# Patient Record
Sex: FEMALE | Race: BLACK OR AFRICAN AMERICAN | URBAN - METROPOLITAN AREA
[De-identification: names, ages, dates, MRNs, and addresses within clinical notes are randomized per-mention and may not be internally consistent; named-entity substitution may affect disease eponyms.]

---

## 2017-09-14 ENCOUNTER — OFFICE VISIT (OUTPATIENT)
Dept: FAMILY MEDICINE CLINIC | Age: 42
End: 2017-09-14

## 2017-09-14 VITALS
OXYGEN SATURATION: 96 % | SYSTOLIC BLOOD PRESSURE: 144 MMHG | HEART RATE: 79 BPM | WEIGHT: 240 LBS | DIASTOLIC BLOOD PRESSURE: 89 MMHG | BODY MASS INDEX: 38.57 KG/M2 | RESPIRATION RATE: 14 BRPM | HEIGHT: 66 IN | TEMPERATURE: 97.6 F

## 2017-09-14 DIAGNOSIS — K50.00 CROHN'S DISEASE OF SMALL INTESTINE WITHOUT COMPLICATION (HCC): Primary | ICD-10-CM

## 2017-09-14 PROBLEM — K50.90 CROHN DISEASE (HCC): Status: ACTIVE | Noted: 2017-09-14

## 2017-09-14 RX ORDER — METHYLPREDNISOLONE 4 MG/1
TABLET ORAL
COMMUNITY
Start: 2017-07-06 | End: 2017-10-17 | Stop reason: ALTCHOICE

## 2017-09-14 RX ORDER — HYDROXYZINE PAMOATE 25 MG/1
25 CAPSULE ORAL ONCE
COMMUNITY
Start: 2017-09-13

## 2017-09-14 RX ORDER — MUPIROCIN 20 MG/G
OINTMENT TOPICAL
COMMUNITY
Start: 2017-08-11 | End: 2017-10-17 | Stop reason: ALTCHOICE

## 2017-09-14 RX ORDER — MINOCYCLINE HYDROCHLORIDE 100 MG/1
CAPSULE ORAL
COMMUNITY
Start: 2017-08-16 | End: 2017-10-17 | Stop reason: ALTCHOICE

## 2017-09-14 RX ORDER — LANCETS
EACH MISCELLANEOUS
COMMUNITY
Start: 2017-07-24

## 2017-09-14 RX ORDER — TRIAMCINOLONE ACETONIDE 1 MG/G
CREAM TOPICAL
COMMUNITY
Start: 2017-08-11

## 2017-09-14 RX ORDER — AZATHIOPRINE 50 MG/1
50 TABLET ORAL DAILY
COMMUNITY
Start: 2017-09-01

## 2017-09-14 RX ORDER — NITROFURANTOIN 25; 75 MG/1; MG/1
CAPSULE ORAL
COMMUNITY
Start: 2017-07-24 | End: 2017-10-17 | Stop reason: ALTCHOICE

## 2017-09-14 RX ORDER — BLOOD SUGAR DIAGNOSTIC
STRIP MISCELLANEOUS
COMMUNITY
Start: 2017-07-24

## 2017-09-14 NOTE — PROGRESS NOTES
[facility]        Name and  verified        Chief Complaint   Patient presents with   [facility] Patient

## 2017-09-14 NOTE — PROGRESS NOTES
HISTORY OF PRESENT ILLNESS  Chloé Marti is a 38 y.o. female. HPI     Obesity with crohn Dz  Specific concerns today for my pt is the wt concerning, the patient states that no self induced vomiting, and no binge eating,  has been thinking about the use of any laxative use for a better wt, pt also states that different available diets has been tried, does some daily exercises, tries to avoid fast food as much as possible. In addition, the patient states that eating too much is not the case and the portion are very well controlled being on the heavy side for a few yrs, very concerned about the huge wt, gives every body a bad body image,and finally stating that the best is to loose as much wt as possible. azathioprin and others   No fhx of heart dz,   No hx of sudden death <34yo    Allergies not on file  No past medical history on file. No past surgical history on file. No family history on file. Social History   Substance Use Topics    Smoking status: Not on file    Smokeless tobacco: Not on file    Alcohol use Not on file      No results found for: HBA1C, PNO0OHCH, HGBE8, GLU, GESTF, GLUCPOC, MCACR, MCA1, MCA2, MCA3, MCAU, LDL, LDLC, DLDLP, ELIZABETH, CREAPOC, MCREA, ACREA, CREA, REFC3, REFC4, [id number]   No results found for: TSH, TSH2, TSH3, TSHP, TSHELE, TSHEXT, TT3, T3U, T3UP, FRT3, FT3, FT4, FT4P, T4, T4P, FT4T, TT7, TSHEXT      Review of Systems   Constitutional: Negative for chills and fever. HENT: Negative for ear pain and nosebleeds. Eyes: Negative for blurred vision, pain and discharge. Respiratory: Negative for shortness of breath. Cardiovascular: Negative for chest pain and leg swelling. Gastrointestinal: Negative for constipation, diarrhea, nausea and vomiting. Genitourinary: Negative for frequency. Musculoskeletal: Negative for joint pain. Skin: Negative for itching and rash. Neurological: Negative for headaches. Psychiatric/Behavioral: Negative for depression.  The patient is not nervous/anxious. Physical Exam   Constitutional: She is oriented to person, place, and time. She appears well-developed and well-nourished. HENT:   Head: Normocephalic and atraumatic. Eyes: Conjunctivae and EOM are normal.   Neck: Normal range of motion. Neck supple. Cardiovascular: Normal rate, regular rhythm and normal heart sounds. No murmur heard. Pulmonary/Chest: Effort normal and breath sounds normal.   Abdominal: Soft. Bowel sounds are normal. She exhibits no distension. Musculoskeletal: Normal range of motion. She exhibits no edema. Neurological: She is alert and oriented to person, place, and time. Skin: No erythema. Psychiatric: Her behavior is normal.   Nursing note and vitals reviewed. ASSESSMENT and PLAN  Diagnoses and all orders for this visit:    1.  Crohn's disease of small intestine without complication (HCC)  -     CBC W/O DIFF  -     METABOLIC PANEL, COMPREHENSIVE  -     TSH 3RD GENERATION  -     CHOLESTEROL, TOTAL  -     HDL CHOLESTEROL         I have recommended the following steps for improving the current weight, pt was counseled on to try to Decrease calori intake by 500per day, limit the amount of intake and portion the meals,   Attend weight loss classes and lectures, Pt agreed with all the recommendations

## 2017-09-14 NOTE — MR AVS SNAPSHOT
Visit Information Date & Time Provider Department Dept. Phone Encounter #  
 9/14/2017 11:00 AM Amelia Mason MD [facility] OFFICE-[facility]EX [phone] [id number] Follow-up Instructions Return in about 2 weeks (around 9/28/2017), or if symptoms worsen or fail to improve. Your Appointments 12/14/2017  9:30 AM  
Any with Alan Enriquez MD  
[facility] OFFICE-ANN[facility] ([address]) Appt Note: 3 mo f/u  
 [address] [facility] [address] [id number]  
[phone] [facility] [id number] Upcoming Health Maintenance Date Due DTaP/Tdap/Td series (1 - Tdap) 5/30/1994 PAP AKA CERVICAL CYTOLOGY 5/30/1994 INFLUENZA AGE 9 TO ADULT 8/1/2017 Allergies as of 9/14/2017  Review Complete On: 9/14/2017 By: Miriam Cody LPN Not on File Current Immunizations  Never Reviewed No immunizations on file. Not reviewed this visit You Were Diagnosed With   
  
 Codes Comments Cro[facility]'s [facility] of small intestine without complication [facility]    -  Primary ICD-10-CM: K50.00 ICD-9-CM: 555.0 Vitals BP Pulse Temp Resp Height(growth percentile) Weight(growth percentile) 144/89 (BP 1 Location: Left arm, BP Patient Position: At rest) 79 97.6 °F (36.4 °C) (Oral) 14 5' 6\" (1.676 m) 240 lb (108.9 kg) SpO2 BMI OB Status Smoking Status 96% 38.74 kg/m2 Medically Induced Never Smoker Vitals History BMI and BSA Data Body Mass Index Body Surface Area 38.74 kg/m 2 2.25 m 2 Your Updated Medication List  
  
   
This list is accurate as of: 9/14/17 11:35 AM.  Always use your most recent med list.  
  
  
  
  
 [facility] Generic drug:  Lancets  
  
 azaTHIOprine 50 mg tablet Commonly known as:  IMURAN  
  
 hydrOXYzine pamoate 25 mg capsule Commonly known as:  VISTARIL  
  
 JOLIVETTE 0.35 mg Tab Generic drug:  norethindrone  
  
 methylPREDNISolone 4 mg tablet Commonly known as:  MEDROL DOSEPACK  
  
 minocycline 100 mg capsule Commonly known as:  MINOCIN, DYNACIN  
  
 mupirocin 2 % ointment Commonly known as:  BACTROBAN  
  
 nitrofurantoin (macrocrystal-monohydrate) 100 mg capsule Commonly known as:  MACROBID PRECISION XTRA TEST strip Generic drug:  glucose blood VI test strips  
  
 triamcinolone acetonide 0.1 % topical cream  
Commonly known as:  KENALOG We Performed the Following CBC W/O DIFF [21547 CPT(R)] CHOLESTEROL, TOTAL [94641 CPT(R)] HDL CHOLESTEROL [91411 CPT(R)] METABOLIC PANEL, COMPREHENSIVE [19544 CPT(R)] TSH 3RD GENERATION [42036 CPT(R)] Follow-up Instructions Return in about 2 weeks (around 9/28/2017), or if symptoms worsen or fail to improve. Introducing [facility]! Pooja Frazier introduces Face-Me patient portal. Now you can access parts of your medical record, email your doctor's office, and request medication refills online. 1. In your internet browser, go to [email or web address] 2. Click on the First Time User? Click Here link in the Sign In box. You will see the New Member Sign Up page. 3. Enter your Face-Me Access Code exactly as it appears below. You will not need to use this code after youve completed the sign-up process. If you do not sign up before the expiration date, you must request a new code. · Face-Me Access Code: [email or web address] Expires: 12/13/2017 11:34 AM 
 
4. Enter the last four digits of your Social Security Number (xxxx) and Date of Birth (mm/dd/yyyy) as indicated and click Submit. You will be taken to the next sign-up page. 5. Create a Face-Me ID. This will be your Face-Me login ID and cannot be changed, so think of one that is secure and easy to remember. 6. Create a [employer] password. You can change your password at any time. 7. Enter your Password Reset Question and Answer. This can be used at a later time if you forget your password. 8. Enter your e-mail address. You will receive e-mail notification when new information is available in [address] 9. Click Sign Up. You can now view and download portions of your medical record. 10. Click the Download Summary menu link to download a portable copy of your medical information. If you have questions, please visit the Frequently Asked Questions section of the [employer] website. Remember, [employer]ines is NOT to be used for urgent needs. For medical emergencies, dial 911. Now available from your iPhone and Android! Please provide this summary of care documentation to your next provider. Your primary care clinician is listed as Tahir Munoz. If you have any questions after today's visit, please call [phone].

## 2017-09-15 LAB
ALBUMIN SERPL-MCNC: 3.8 G/DL (ref 3.5–5.5)
ALBUMIN/GLOB SERPL: 1.2 {RATIO} (ref 1.2–2.2)
ALP SERPL-CCNC: 89 IU/L (ref 39–117)
ALT SERPL-CCNC: 16 IU/L (ref 0–32)
AST SERPL-CCNC: 14 IU/L (ref 0–40)
BILIRUB SERPL-MCNC: 0.6 MG/DL (ref 0–1.2)
BUN SERPL-MCNC: 13 MG/DL (ref 6–24)
BUN/CREAT SERPL: 13 (ref 9–23)
CALCIUM SERPL-MCNC: 9.4 MG/DL (ref 8.7–10.2)
CHLORIDE SERPL-SCNC: 99 MMOL/L (ref 96–106)
CHOLEST SERPL-MCNC: 198 MG/DL (ref 100–199)
CO2 SERPL-SCNC: 25 MMOL/L (ref 18–29)
CREAT SERPL-MCNC: 1.04 MG/DL (ref 0.57–1)
ERYTHROCYTE [DISTWIDTH] IN BLOOD BY AUTOMATED COUNT: 14.9 % (ref 12.3–15.4)
GLOBULIN SER CALC-MCNC: 3.2 G/DL (ref 1.5–4.5)
GLUCOSE SERPL-MCNC: 64 MG/DL (ref 65–99)
HCT VFR BLD AUTO: 37.6 % (ref 34–46.6)
HDLC SERPL-MCNC: 36 MG/DL
HGB BLD-MCNC: 12.6 G/DL (ref 11.1–15.9)
MCH RBC QN AUTO: 26.9 PG (ref 26.6–33)
MCHC RBC AUTO-ENTMCNC: 33.5 G/DL (ref 31.5–35.7)
MCV RBC AUTO: 80 FL (ref 79–97)
PLATELET # BLD AUTO: 229 X10E3/UL (ref 150–379)
POTASSIUM SERPL-SCNC: 4.2 MMOL/L (ref 3.5–5.2)
PROT SERPL-MCNC: 7 G/DL (ref 6–8.5)
RBC # BLD AUTO: 4.69 X10E6/UL (ref 3.77–5.28)
SODIUM SERPL-SCNC: 140 MMOL/L (ref 134–144)
TSH SERPL DL<=0.005 MIU/L-ACNC: 0.8 UIU/ML (ref 0.45–4.5)
WBC # BLD AUTO: 8.1 X10E3/UL (ref 3.4–10.8)

## 2017-10-17 ENCOUNTER — OFFICE VISIT (OUTPATIENT)
Dept: FAMILY MEDICINE CLINIC | Age: 42
End: 2017-10-17

## 2017-10-17 VITALS
BODY MASS INDEX: 38.89 KG/M2 | OXYGEN SATURATION: 97 % | TEMPERATURE: 98.1 F | HEIGHT: 66 IN | HEART RATE: 73 BPM | SYSTOLIC BLOOD PRESSURE: 172 MMHG | RESPIRATION RATE: 14 BRPM | DIASTOLIC BLOOD PRESSURE: 96 MMHG | WEIGHT: 242 LBS

## 2017-10-17 DIAGNOSIS — K50.90 CROHN'S DISEASE WITHOUT COMPLICATION, UNSPECIFIED GASTROINTESTINAL TRACT LOCATION (HCC): Primary | ICD-10-CM

## 2017-10-17 LAB — HBA1C MFR BLD HPLC: 6.5 %

## 2017-10-17 RX ORDER — ACETAMINOPHEN 500 MG
TABLET ORAL
COMMUNITY

## 2017-10-17 RX ORDER — PHENOL/SODIUM PHENOLATE
20 AEROSOL, SPRAY (ML) MUCOUS MEMBRANE DAILY
COMMUNITY

## 2017-10-17 RX ORDER — AMLODIPINE BESYLATE 10 MG/1
TABLET ORAL DAILY
COMMUNITY

## 2017-10-17 RX ORDER — GLIPIZIDE 5 MG/1
TABLET ORAL 2 TIMES DAILY
COMMUNITY

## 2017-10-17 RX ORDER — CITALOPRAM 40 MG/1
40 TABLET, FILM COATED ORAL DAILY
COMMUNITY

## 2017-10-17 NOTE — PROGRESS NOTES
HISTORY OF PRESENT ILLNESS  Jeferson James is a 38 y.o. female.   HPI   Patient present for follow-up concerning about her weight stating that has been dealing with posttraumatic stress syndrome currently taking Wellbutrin and hydroxyzine for sleep is not eating too much or not to lose as much weight as possible in addition has a history of Crohn disease currently taking as a few provide has not had any attacks recently usually get her medication from [facility] in addition stating that she has diabetes last visit she did not mention any of the current medical condition her diabetic number was not checked she just presented as a new patient for weight management for high body mass index  On glipizide and propranolol, amlodipine, rtc for wt loss management pt w/hx of crohn dz, took steroid for so long and the wt went up, no crohn flares recently,   Patient denies any suicidal homicidal ideation stating that she is compliant with her medication usually her blood pressure is normal today is agitated and does not feel comfortable at doctor's office    Current Outpatient Prescriptions   Medication Sig Dispense Refill    azaTHIOprine (IMURAN) 50 mg tablet       PRECISION XTRA TEST strip       hydrOXYzine pamoate (VISTARIL) 25 mg capsule       ACCU-CHEK SOFTCLIX LANCETS misc       methylPREDNISolone (MEDROL DOSEPACK) 4 mg tablet       minocycline (MINOCIN, DYNACIN) 100 mg capsule       mupirocin (BACTROBAN) 2 % ointment       nitrofurantoin, macrocrystal-monohydrate, (MACROBID) 100 mg capsule       JOLIVETTE 0.35 mg tab       triamcinolone acetonide (KENALOG) 0.1 % topical cream        Not on File  Past Medical History:   Diagnosis Date    Anemia     [facility]immune [facility] ([facility]     Contact dermatitis and other eczema, due to unspecified cause     Depression     Diabetes ([facility]     GERD (gastroesophageal reflux disease)     Headache     Hypertension      Past Surgical History:   Procedure Laterality Date    HX BREAST REDUCTION      HX GYN       No family history on file. Social History   Substance Use Topics    Smoking status: Never Smoker    Smokeless tobacco: Never Used    Alcohol use Yes      Comment: at times      Lab Results  Component Value Date/Time   WBC 8.1 09/14/2017 11:36 AM   HGB 12.6 09/14/2017 11:36 AM   HCT 37.6 09/14/2017 11:36 AM   PLATELET 647 32/61/2059 11:36 AM   MCV 80 09/14/2017 11:36 AM     Lab Results  Component Value Date/Time   Glucose 64 09/14/2017 11:36 AM   Creatinine 1.04 09/14/2017 11:36 AM      Lab Results  Component Value Date/Time   Cholesterol, total 198 09/14/2017 11:36 AM   HDL Cholesterol 36 09/14/2017 11:36 AM   Lab Results  Component Value Date/Time   GFR est non-AA 66 09/14/2017 11:36 AM   GFR est AA 76 09/14/2017 11:36 AM   Creatinine 1.04 09/14/2017 11:36 AM   BUN 13 09/14/2017 11:36 AM   Sodium 140 09/14/2017 11:36 AM   Potassium 4.2 09/14/2017 11:36 AM   Chloride 99 09/14/2017 11:36 AM   CO2 25 09/14/2017 11:36 AM   Lab Results  Component Value Date/Time   TSH 0.798 09/14/2017 11:36 AM           Review of Systems   Constitutional: Negative for chills and fever. HENT: Negative for ear pain and nosebleeds. Eyes: Negative for blurred vision, pain and discharge. Respiratory: Negative for shortness of breath. Cardiovascular: Negative for chest pain and leg swelling. Gastrointestinal: Negative for constipation, diarrhea, nausea and vomiting. Genitourinary: Negative for frequency. Musculoskeletal: Negative for joint pain. Skin: Negative for itching and rash. Neurological: Negative for headaches. Psychiatric/Behavioral: Negative for depression. The patient is not nervous/anxious. Physical Exam   Constitutional: She is oriented to person, place, and time. She appears well-developed and well-nourished. HENT:   Head: Normocephalic and atraumatic. Eyes: Conjunctivae and EOM are normal.   Neck: Normal range of motion. Neck supple.    Cardiovascular: Normal rate, regular rhythm and normal heart sounds. No murmur heard. Pulmonary/Chest: Effort normal and breath sounds normal.   Abdominal: Soft. Bowel sounds are normal. She exhibits no distension. Musculoskeletal: Normal range of motion. She exhibits no edema. Neurological: She is alert and oriented to person, place, and time. Skin: No erythema. Psychiatric: Her behavior is normal.   Nursing note and vitals reviewed. ASSESSMENT and PLAN  Diagnoses and all orders for this visit:    1. Crohn's disease without complication, unspecified gastrointestinal tract location [facility]  -     AMB POC HEMOGLOBIN A1C    2. BMI 38.0-38.9,adult  -     AMB POC HEMOGLOBIN A1C    Other orders  -     naltrexone-buPROPion (CONTRAVE) 8-90 mg TbER ER tablet; Take 1 Tab by mouth daily. First Month:     Week 1 : 1 Tab AM  Week 2 : 1 Tab AM, 1 Tab PM  Week 3 : 2 Tab AM, 1 Tab PM  Week 4 : 2 Tab AM, 2 Tab PM  -     naltrexone-buPROPion (CONTRAVE) 8-90 mg TbER ER tablet; Week 5 and Beyond: Contrave 8mg/90mg #120   2 Tab AM, 2 Tab PM         I have recommended the following steps for improving the current weight, pt was counseled on to try to Decrease calori intake by 500per day, limit the amount of intake and portion the meals, The patient is advised to avoid second hand exposure, begin progressive daily aerobic exercise program at least x 5 per week,  follow a low fat, low cholesterol diet, attempt to keep dairy records of  Weight and the daily intake, reduce salt in diet and cooking, reduce exposure to stress, improve dietary compliance, continue current healthy lifestyle patterns.  Attend weight loss classes and lectures, Pt agreed with all the recommendations

## 2017-10-17 NOTE — PATIENT INSTRUCTIONS
Crohn's Disease: Care Instructions  Your Care Instructions    Crohn's disease is a lifelong inflammatory bowel disease (IBD). Parts of the digestive tract get swollen and irritated and may develop deep sores called ulcers. Crohn's disease usually occurs in the last part of the small intestine and the first part of the large intestine. But it can develop anywhere from the mouth to the anus. The main symptoms of Crohn's disease are belly pain, diarrhea, fever, and weight loss. Some people may have constipation. Crohn's disease also sometimes causes problems with the joints, eyes, or skin. Your symptoms may be mild at some times and severe at others. The disease can also go into remission, which means that it is not active and you have no symptoms. Bad attacks of Crohn's disease often have to be treated in the hospital so that you can get medicines and liquids through a tube in your vein, called an IV. This gives your digestive system time to rest and recover. Talk with your doctor about the best treatments for you. You may need medicines that help prevent or treat flare-ups of the disease. You may need surgery to remove part of your bowel if you have an abnormal opening in the bowel (fistula), an abscess, or a bowel obstruction. In some cases, surgery is needed if medicines do not work. But symptoms often return to other areas of the intestines after surgery. Learning good self-care can help you reduce your symptoms and manage Crohn's disease. Follow-up care is a key part of your treatment and safety. Be sure to make and go to all appointments, and call your doctor if you are having problems. Its also a good idea to know your test results and keep a list of the medicines you take. How can you care for yourself at home? · Take your medicines exactly as prescribed. Call your doctor if you think you are having a problem with your medicine.  You will get more details on the specific medicines your doctor prescribes. · Do not take anti-inflammatory medicines, such as aspirin, ibuprofen (Advil, Motrin), or naproxen (Aleve). They may make your symptoms worse. Do not take any other medicines or herbal products without talking to your doctor first.  · Avoid foods that make your symptoms worse. These might include milk, alcohol, high-fiber foods, or spicy foods. · Eat a healthy diet. Make sure to get enough iron. Rectal bleeding may make you lose iron. Good sources of iron include beef, lentils, spinach, raisins, and iron-enriched breads and cereals. · Drink liquid meal replacements if your doctor recommends them. These are high in calories and contain vitamins and minerals. Severe symptoms may make it hard for your body to absorb vitamins and minerals. · Do not smoke. Smoking makes Crohn's disease worse. If you need help quitting, talk to your doctor about stop-smoking programs and medicines. These can increase your chances of quitting for good. · Seek support from friends and family to help cope with Crohn's disease. The illness can affect all parts of your life. Get counseling if you need it. When should you call for help? Call 911 anytime you think you may need emergency care. For example, call if:  · You have severe belly pain. · You passed out (lost consciousness). Call your doctor now or seek immediate medical care if:  · You have signs of needing more fluids. You have sunken eyes and a dry mouth, and you pass only a little dark urine. · You have pain and swelling in the anal area, or you have pus draining from the anal area. · You have a fever or shaking chills. · Your belly is bloated. Watch closely for changes in your health, and be sure to contact your doctor if:  · Your symptoms get worse. · You have diarrhea for more than 2 weeks. · Your pain is not steadily getting better. · You have unexplained weight loss. Where can you learn more?   Go to http://andrew-ofe.info/. Enter 21  in the search box to learn more about \"Crohn's Disease: Care Instructions. \"  Current as of: August 9, 2016  Content Version: 11.3  © 4500-6416 [employer]. Care instructions adapted under license by [employer] (which disclaims liability or warranty for this information). If you have questions about a medical condition or this instruction, always ask your healthcare professional. [place] [address] any warranty or liability for your use of this information. Learning About Diabetes Food Guidelines  Your Care Instructions  Meal planning is important to manage diabetes. It helps keep your blood sugar at a target level (which you set with your doctor). You don't have to eat special foods. You can eat what your family eats, including sweets once in a while. But you do have to pay attention to how often you eat and how much you eat of certain foods. You may want to work with a dietitian or a certified diabetes educator (CDE) to help you plan meals and snacks. A dietitian or CDE can also help you lose weight if that is one of your goals. What should you know about eating carbs? Managing the amount of carbohydrate (carbs) you eat is an important part of healthy meals when you have diabetes. Carbohydrate is found in many foods. · Learn which foods have carbs. And learn the amounts of carbs in different foods. ¨ Bread, cereal, pasta, and rice have about 15 grams of carbs in a serving. A serving is 1 slice of bread (1 ounce), ½ cup of cooked cereal, or 1/3 cup of cooked pasta or rice. ¨ Fruits have 15 grams of carbs in a serving. A serving is 1 small fresh fruit, such as an apple or orange; ½ of a banana; ½ cup of cooked or canned fruit; ½ cup of fruit juice; 1 cup of melon or raspberries; or 2 tablespoons of dried fruit. ¨ Milk and no-sugar-added yogurt have 15 grams of carbs in a serving.  A serving is 1 cup of milk or 2/3 cup of no-sugar-added yogurt. ¨ Starchy vegetables have 15 grams of carbs in a serving. A serving is ½ cup of mashed potatoes or sweet potato; 1 cup winter squash; ½ of a small baked potato; ½ cup of cooked beans; or ½ cup cooked corn or green peas. · Learn how much carbs to eat each day and at each meal. A dietitian or CDE can teach you how to keep track of the amount of carbs you eat. This is called carbohydrate counting. · If you are not sure how to count carbohydrate grams, use the Plate Method to plan meals. It is a good, quick way to make sure that you have a balanced meal. It also helps you spread carbs throughout the day. ¨ Divide your plate by types of foods. Put non-starchy vegetables on half the plate, meat or other protein food on one-quarter of the plate, and a grain or starchy vegetable in the final quarter of the plate. To this you can add a small piece of fruit and 1 cup of milk or yogurt, depending on how many carbs you are supposed to eat at a meal.  · Try to eat about the same amount of carbs at each meal. Do not \"save up\" your daily allowance of carbs to eat at one meal.  · Proteins have very little or no carbs per serving. Examples of proteins are beef, chicken, turkey, fish, eggs, tofu, cheese, cottage cheese, and peanut butter. A serving size of meat is 3 ounces, which is about the size of a deck of cards. Examples of meat substitute serving sizes (equal to 1 ounce of meat) are 1/4 cup of cottage cheese, 1 egg, 1 tablespoon of peanut butter, and ½ cup of tofu. How can you eat out and still eat healthy? · Learn to estimate the serving sizes of foods that have carbohydrate. If you measure food at home, it will be easier to estimate the amount in a serving of restaurant food. · If the meal you order has too much carbohydrate (such as potatoes, corn, or baked beans), ask to have a low-carbohydrate food instead. Ask for a salad or green vegetables.   · If you use insulin, check your blood sugar before and after eating out to help you plan how much to eat in the future. · If you eat more carbohydrate at a meal than you had planned, take a walk or do other exercise. This will help lower your blood sugar. What else should you know? · Limit saturated fat, such as the fat from meat and dairy products. This is a healthy choice because people who have diabetes are at higher risk of heart disease. So choose lean cuts of meat and nonfat or low-fat dairy products. Use olive or canola oil instead of butter or shortening when cooking. · Don't skip meals. Your blood sugar may drop too low if you skip meals and take insulin or certain medicines for diabetes. · Check with your doctor before you drink alcohol. Alcohol can cause your blood sugar to drop too low. Alcohol can also cause a bad reaction if you take certain diabetes medicines. Follow-up care is a key part of your treatment and safety. Be sure to make and go to all appointments, and call your doctor if you are having problems. It's also a good idea to know your test results and keep a list of the medicines you take. Where can you learn more? Go to http://andrew-ofe.info/. Enter A059 in the search box to learn more about \"Learning About Diabetes Food Guidelines. \"  Current as of: March 13, 2017  Content Version: 11.3  © 0301-9583 [employer]. Care instructions adapted under license by [employer] (which disclaims liability or warranty for this information). If you have questions about a medical condition or this instruction, always ask your healthcare professional. [address] any warranty or liability for your use of this information.

## 2017-10-17 NOTE — PROGRESS NOTES
[address] [place] [address]        Name and  verified      Chief Complaint   Patient presents with    Follow-up     Crohn's Disease    Diabetes    Weight Management           Health Maintenance reviewed-discussed with patient. 1. Have you been to the ER, urgent care clinic since your last visit? Hospitalized since your last visit? No    2. Have you seen or consulted any other health care providers outside of the [address] [place] [address] since your last visit? Include any pap smears or colon screening.  No